# Patient Record
Sex: MALE | Race: OTHER | URBAN - METROPOLITAN AREA
[De-identification: names, ages, dates, MRNs, and addresses within clinical notes are randomized per-mention and may not be internally consistent; named-entity substitution may affect disease eponyms.]

---

## 2024-07-26 ENCOUNTER — TELEPHONE (OUTPATIENT)
Dept: SURGERY | Facility: CLINIC | Age: 44
End: 2024-07-26

## 2024-07-26 NOTE — TELEPHONE ENCOUNTER
Received referral from [facility]    Dx: ED     Referral in folder at

## 2024-08-08 ENCOUNTER — OFFICE VISIT (OUTPATIENT)
Dept: SURGERY | Facility: CLINIC | Age: 44
End: 2024-08-08

## 2024-08-08 DIAGNOSIS — R82.90 URINE FINDING: Primary | ICD-10-CM

## 2024-08-08 LAB
APPEARANCE: CLEAR
BILIRUBIN: NEGATIVE
GLUCOSE (URINE DIPSTICK): NEGATIVE MG/DL
KETONES (URINE DIPSTICK): NEGATIVE MG/DL
LEUKOCYTES: NEGATIVE
MULTISTIX LOT#: NORMAL NUMERIC
NITRITE, URINE: NEGATIVE
OCCULT BLOOD: NEGATIVE
PH, URINE: 7.5 (ref 4.5–8)
SPECIFIC GRAVITY: 1.02 (ref 1–1.03)
URINE-COLOR: YELLOW
UROBILINOGEN,SEMI-QN: 0.2 MG/DL (ref 0–1.9)

## 2024-08-08 PROCEDURE — 99204 OFFICE O/P NEW MOD 45 MIN: CPT | Performed by: UROLOGY

## 2024-08-08 PROCEDURE — 81003 URINALYSIS AUTO W/O SCOPE: CPT | Performed by: UROLOGY

## 2024-08-08 RX ORDER — SILDENAFIL 100 MG/1
50 TABLET, FILM COATED ORAL
Qty: 30 TABLET | Refills: 5 | Status: SHIPPED | OUTPATIENT
Start: 2024-08-08

## 2024-08-08 NOTE — PROGRESS NOTES
Urology Clinic Note - New Patient    Referring Provider:  No referring provider defined for this encounter.     Primary Care Provider:  No primary care provider on file.     Chief Complaint:   ED    HPI:     Wan Beavers is a 44 year old male with history of diabetes, HTN, HLD referred for ED.     Patient without previous urologic history.  He presents for evaluation of a 1 year history of erectile dysfunction.  This is coincided with recent diagnosis of diabetes, last A1c was 6.7 but was almost 12 last year when diagnosed.  He is reports that his diabetes control is improving.  He has been on Viagra recently with some improvement but no resolution of his symptoms, he is currently taking 25 mg.  He has no history of UTI or stones.  He has mild urinary symptoms, this is most related to his diabetes and has been improving recently.  No bothersome symptoms today.  No issues with libido or energy  UA without infection or blood  No smoking.   No surgical history.    PSA:  No results found for: \"PSA\", \"PERCENTPSA\", \"PSAS\", \"PSAULTRA\", \"QPSA\", \"PSATOT\", \"TOTPSADX\", \"TOTPSASCREEN\"   No Cr or GFR on file.      History:   No past medical history on file.    No past surgical history on file.    No family history on file.    Social History     Socioeconomic History    Marital status: Unknown     Social Determinants of Health     Financial Resource Strain: Not on File (10/25/2023)    Received from [facility]    Financial Resource Strain     Financial Resource Strain: 0   Food Insecurity: Not at Risk (11/8/2023)    Received from [facility]    Food Insecurity     Food: 1   Transportation Needs: Not at Risk (11/8/2023)    Received from [facility]    Transportation Needs     Transportation: 1   Physical Activity: Not on File (10/25/2023)    Received from [facility]    Physical Activity     Physical Activity: 0   Stress: Not on File (10/25/2023)    Received from [facility]    Stress     Stress: 0   Social Connections: Not on File (10/25/2023)    Received  from [facility]    Social Connections     Social Connections and Isolation: 0    Received from [facility]    Housing Stability       Medications (Active prior to today's visit):  No current outpatient medications on file.       Allergies:  Not on File      Review of Systems:   A comprehensive 10-point review of systems was completed.  Pertinent positives and negatives are noted in the the HPI.    Physical Exam:   CONSTITUTIONAL: Well developed, well nourished, in no acute distress  NEUROLOGIC: Alert and oriented  HEAD: Normocephalic, atraumatic  ENT: Hearing intact   RESPIRATORY: Normal respiratory effort  SKIN: No evident rashes  ABDOMEN: Soft, non-tender, non-distended,       Assessment & Plan:     Wan Beavers is a 44 year old male with history of diabetes, HTN, HLD referred for ED.     # ED    -Lifestyle modifications recommended, including regular exercise, healthy diet, do not smoke, limit alcohol intake, ensure good control of other medical problems   - Discussed increasing dose of Sildenafil to 50mg or 100 mg as needed (side effects, risks, benefits discussed, and good Rx coupon provided) - he will do this  Not interested in ICI, IPP at this point.      # LUTS  Symptoms of OAB; likely related to his diabetes; has been improving on medication. Continue to monitor. Behavioral modificaiton.     Thank you for this consult.    I have personally reviewed all relevant medical records, labs, and imaging.       Kris Crowder MD  Staff Urologist  [facility]  Office: [phone]

## 2025-02-07 ENCOUNTER — OFFICE VISIT (OUTPATIENT)
Dept: SURGERY | Facility: CLINIC | Age: 45
End: 2025-02-07

## 2025-02-07 DIAGNOSIS — N52.9 ERECTILE DYSFUNCTION, UNSPECIFIED ERECTILE DYSFUNCTION TYPE: Primary | ICD-10-CM

## 2025-02-07 LAB
APPEARANCE: CLEAR
BILIRUBIN: NEGATIVE
GLUCOSE (URINE DIPSTICK): NEGATIVE MG/DL
KETONES (URINE DIPSTICK): NEGATIVE MG/DL
LEUKOCYTES: NEGATIVE
MULTISTIX LOT#: NORMAL NUMERIC
NITRITE, URINE: NEGATIVE
OCCULT BLOOD: NEGATIVE
PH, URINE: 6.5 (ref 4.5–8)
PROTEIN (URINE DIPSTICK): NEGATIVE MG/DL
SPECIFIC GRAVITY: 1.02 (ref 1–1.03)
URINE-COLOR: YELLOW
UROBILINOGEN,SEMI-QN: 0.2 MG/DL (ref 0–1.9)

## 2025-02-07 PROCEDURE — 81003 URINALYSIS AUTO W/O SCOPE: CPT | Performed by: UROLOGY

## 2025-02-07 PROCEDURE — 99213 OFFICE O/P EST LOW 20 MIN: CPT | Performed by: UROLOGY

## 2025-02-07 RX ORDER — LANCETS 33 GAUGE
1 EACH MISCELLANEOUS 2 TIMES DAILY
COMMUNITY
Start: 2025-01-15

## 2025-02-07 RX ORDER — TADALAFIL 20 MG/1
20 TABLET ORAL
Qty: 30 TABLET | Refills: 5 | Status: SHIPPED | OUTPATIENT
Start: 2025-02-07

## 2025-02-07 RX ORDER — ATORVASTATIN CALCIUM 10 MG/1
10 TABLET, FILM COATED ORAL NIGHTLY
COMMUNITY
Start: 2024-07-26

## 2025-02-07 RX ORDER — HYDROCHLOROTHIAZIDE 25 MG/1
25 TABLET ORAL DAILY
COMMUNITY
Start: 2025-01-22

## 2025-02-07 NOTE — PROGRESS NOTES
Urology Clinic Note    Primary Care Provider:  No primary care provider on file.     Chief Complaint:   ED    HPI:      Wan Beavers is a 44 year old male with history of diabetes, HTN, HLD referred for ED.    used today.  Patient initially saw me in August 2024.  No previous urologic history.  He had a 1 year history of GASTON.  This was right around the time of his diabetes diagnosis with an A1c of 6.7.  At initial diagnosis his A1c was 12.  He had been on Viagra with some improvement was only on 25 mg at that time.  He had no issues with libido or energy.  His UA was negative.  No smoking history.  Reasonably active.  At initial visit, we started patient on 50 mg of sildenafil, was told that it was okay to increase to 100 if needed.  He also had some mild OAB symptoms, was thought to be related to diabetes and was improving.  Behavioral modification was discussed.  He now returns for follow-up.  Reports little improvement with Viagra.  He is able to achieve erection but unable to maintain.  No other changes to his health.  Urinary symptoms improved.  Per patient sugars have been at goal at home.        PSA:  No results found for: \"PSA\", \"PERCENTPSA\", \"PSAS\", \"PSAULTRA\", \"QPSA\", \"PSATOT\", \"TOTPSADX\", \"TOTPSASCREEN\"     History:   No past medical history on file.    No past surgical history on file.    No family history on file.    Social History     Socioeconomic History    Marital status: Unknown   Tobacco Use    Smoking status: Never    Smokeless tobacco: Never     Social Drivers of Health     Food Insecurity: Not on File (11/8/2023)    Received from [facility]    Food Insecurity     Food: 0   Transportation Needs: Not on File (11/8/2023)    Received from [facility]    Transportation Needs     Transportation: 0   Stress: Not on File (10/25/2023)    Received from [facility]    Stress     Stress: 0    Received from [facility]    Housing Stability       Medications (Active prior to today's  visit):  Current Outpatient Medications   Medication Sig Dispense Refill    metFORMIN 500 MG Oral Tab Take 1 tablet (500 mg total) by mouth 2 (two) times daily with meals.      Lancets (ONETOUCH DELICA PLUS [id number]) Does not apply Misc 1 Lancet 2 (two) times daily.      hydroCHLOROthiazide 25 MG Oral Tab Take 1 tablet (25 mg total) by mouth daily.      atorvastatin 10 MG Oral Tab Take 1 tablet (10 mg total) by mouth nightly.      Sildenafil Citrate 100 MG Oral Tab Take 0.5 tablets (50 mg total) by mouth daily as needed for Erectile Dysfunction. Take ~ one hour prior to sexual activity on an empty stomach. Ok to increase to 100mg if needed 30 tablet 5       Allergies:  Allergies[1]    Review of Systems:   A comprehensive 10-point review of systems was completed.  Pertinent positives and negatives are noted in the the HPI.    Physical Exam:   CONSTITUTIONAL: Well developed, well nourished, in no acute distress  ABDOMEN: Soft, non-tender, non-distended     Assessment & Plan:   ED  Ongoing behavior modification, diabetes control discussed.  Patient has been on Viagra.  He believes he has been taking 100 mg without much benefit.  We discussed other options in detail including alternate medications, injections, IPP.  For now, he would like to avoid injections.  He would like to try Cialis.  Risks and benefits discussed.  If this does not work he would like a referral for IPP placement.  All questions answered.       In total, 20 minutes were spent on this patient encounter (including chart review, patient history, physical, and counseling, documentation, and communication).    Kris Crowder MD  Staff Urologist  [facility]  Office: [phone]         [1] No Known Allergies

## 2025-04-06 NOTE — PROGRESS NOTES
Urology Clinic Note    Primary Care Provider:  No primary care provider on file.     Chief Complaint:     ED    HPI:      Wan Beavers is a 44 year old male with history of diabetes, HTN, HLD referred for ED.    used today.    Patient initially saw me in August 2024.  No previous urologic history.  He had a 1 year history of ED.  This was right around the time of his diabetes diagnosis with an A1c of 6.7.  At initial diagnosis his A1c was 12.  He had been on Viagra with some improvement was only on 25 mg at that time.  He had no issues with libido or energy.  His UA was negative.  No smoking history.  Reasonably active.  At initial visit, we started patient on 50 mg of sildenafil, was told that it was okay to increase to 100 if needed.  He also had some mild OAB symptoms, was thought to be related to diabetes and was improving.  Behavioral modification was discussed.      For follow-up in February.  He was doing well.  Minimal improvement with Viagra.  Urination was improved.  Per patient sugars have been at goal at home.     The patient's last visit 2/7/25-he wanted to change from Viagra to Cialis.  He was started on 20 mg.  He now presents for follow-up.  He reports improvement in his symptoms with Cialis.  He is happy with this but wanted to discuss other future options today as well.             PSA:  No results found for: \"PSA\", \"PERCENTPSA\", \"PSAS\", \"PSAULTRA\", \"QPSA\", \"PSATOT\", \"TOTPSADX\", \"TOTPSASCREEN\"     History:   No past medical history on file.    No past surgical history on file.    No family history on file.    Social History     Socioeconomic History    Marital status: Unknown   Tobacco Use    Smoking status: Never    Smokeless tobacco: Never     Social Drivers of Health     Food Insecurity: Not on File (11/8/2023)    Received from [facility]    Food Insecurity     Food: 0   Transportation Needs: Not on File (11/8/2023)    Received from [employer]    Transportation Needs     Transportation: 0    Stress: Not on File (10/25/2023)    Received from [facility]    Stress     Stress: 0    Received from [facility]    Housing Stability       Medications (Active prior to today's visit):  Current Outpatient Medications   Medication Sig Dispense Refill    metFORMIN 500 MG Oral Tab Take 1 tablet (500 mg total) by mouth 2 (two) times daily with meals.      Lancets (ONETOUCH DELICA PLUS [id number]) Does not apply Misc 1 Lancet 2 (two) times daily.      hydroCHLOROthiazide 25 MG Oral Tab Take 1 tablet (25 mg total) by mouth daily.      atorvastatin 10 MG Oral Tab Take 1 tablet (10 mg total) by mouth nightly.      Tadalafil (CIALIS) 20 MG Oral Tab Take 1 tablet (20 mg total) by mouth daily as needed for Erectile Dysfunction. 30 tablet 5       Allergies:  Allergies[1]    Review of Systems:   A comprehensive 10-point review of systems was completed.  Pertinent positives and negatives are noted in the the HPI.    Physical Exam:   CONSTITUTIONAL: Well developed, well nourished, in no acute distress  ABDOMEN: Soft, non-tender, non-distended     Assessment & Plan:   ED  Patient doing much better at this time with Cialis.  His blood sugars have also apparently been improved which is also likely contributing to his improvement in symptoms.  He will continue this for now.  Patient had many questions regarding penile prosthesis placement, we discussed this extensively and reading material was provided to the patient.  After detailed discussion he wants to hold off on this and continue with medication at this time.  He will return in 1 year.  Cialis refill placed.       In total, 30 minutes were spent on this patient encounter (including chart review, patient history, physical, and counseling, documentation, and communication).      Kris Crowder MD  Staff Urologist  [facility]  Office: [phone]         [1] No Known Allergies

## 2025-04-07 ENCOUNTER — OFFICE VISIT (OUTPATIENT)
Dept: SURGERY | Facility: CLINIC | Age: 45
End: 2025-04-07

## 2025-04-07 DIAGNOSIS — N52.01 ERECTILE DYSFUNCTION DUE TO ARTERIAL INSUFFICIENCY: Primary | ICD-10-CM

## 2025-04-07 PROCEDURE — 99214 OFFICE O/P EST MOD 30 MIN: CPT | Performed by: UROLOGY

## 2025-04-07 RX ORDER — TADALAFIL 20 MG/1
20 TABLET ORAL
Qty: 30 TABLET | Refills: 5 | Status: SHIPPED | OUTPATIENT
Start: 2025-04-07